# Patient Record
Sex: FEMALE | Race: WHITE | NOT HISPANIC OR LATINO | URBAN - METROPOLITAN AREA
[De-identification: names, ages, dates, MRNs, and addresses within clinical notes are randomized per-mention and may not be internally consistent; named-entity substitution may affect disease eponyms.]

---

## 2019-08-06 ENCOUNTER — NEW PATIENT (OUTPATIENT)
Dept: URBAN - METROPOLITAN AREA CLINIC 30 | Facility: CLINIC | Age: 69
End: 2019-08-06
Payer: MEDICARE

## 2019-08-06 DIAGNOSIS — H16.223 KERATOCONJUNCTIVITIS SICCA, BILATERAL: ICD-10-CM

## 2019-08-06 DIAGNOSIS — H25.813 COMBINED FORMS OF AGE-RELATED CATARACT, BILATERAL: ICD-10-CM

## 2019-08-06 DIAGNOSIS — R73.03 OTHER ABNORMAL GLUCOSE: Primary | ICD-10-CM

## 2019-08-06 PROCEDURE — 92134 CPTRZ OPH DX IMG PST SGM RTA: CPT | Performed by: OPTOMETRIST

## 2019-08-06 PROCEDURE — 92004 COMPRE OPH EXAM NEW PT 1/>: CPT | Performed by: OPTOMETRIST

## 2019-08-06 ASSESSMENT — INTRAOCULAR PRESSURE
OD: 14
OS: 14

## 2019-08-06 ASSESSMENT — KERATOMETRY
OD: 42.97
OS: 42.88

## 2019-08-06 ASSESSMENT — VISUAL ACUITY
OS: 20/30
OD: 20/30

## 2019-09-03 ENCOUNTER — Encounter (OUTPATIENT)
Dept: URBAN - METROPOLITAN AREA CLINIC 30 | Facility: CLINIC | Age: 69
End: 2019-09-03
Payer: MEDICARE

## 2019-09-03 DIAGNOSIS — H25.11 AGE-RELATED NUCLEAR CATARACT, RIGHT EYE: Primary | ICD-10-CM

## 2019-09-03 DIAGNOSIS — Z01.818 ENCOUNTER FOR OTHER PREPROCEDURAL EXAMINATION: Primary | ICD-10-CM

## 2019-09-03 PROCEDURE — 99213 OFFICE O/P EST LOW 20 MIN: CPT | Performed by: PHYSICIAN ASSISTANT

## 2019-09-03 RX ORDER — CETIRIZINE HCL/PSEUDOEPHEDRINE 5 MG-120MG
10 MG TABLET, EXTENDED RELEASE 12 HR ORAL
Qty: 0 | Refills: 0 | Status: ACTIVE
Start: 2019-09-03

## 2019-09-03 RX ORDER — LIOTHYRONINE SODIUM 5 UG/1
TABLET ORAL
Qty: 0 | Refills: 0 | Status: INACTIVE
Start: 2019-09-03 | End: 2020-03-03

## 2019-09-03 RX ORDER — ESOMEPRAZOLE MAGNESIUM 40 MG/1
40 MG CAPSULE, DELAYED RELEASE ORAL
Qty: 0 | Refills: 0 | Status: INACTIVE
Start: 2019-09-03 | End: 2020-03-03

## 2019-09-09 ENCOUNTER — NEW PATIENT (OUTPATIENT)
Dept: URBAN - METROPOLITAN AREA CLINIC 24 | Facility: CLINIC | Age: 69
End: 2019-09-09
Payer: MEDICARE

## 2019-09-09 PROCEDURE — 92012 INTRM OPH EXAM EST PATIENT: CPT | Performed by: OPHTHALMOLOGY

## 2019-09-09 PROCEDURE — 76519 ECHO EXAM OF EYE: CPT | Performed by: OPHTHALMOLOGY

## 2019-09-09 ASSESSMENT — INTRAOCULAR PRESSURE
OD: 14
OS: 14

## 2019-09-16 ENCOUNTER — SURGERY (OUTPATIENT)
Dept: URBAN - METROPOLITAN AREA SURGERY 12 | Facility: SURGERY | Age: 69
End: 2019-09-16
Payer: MEDICARE

## 2019-09-16 PROCEDURE — 66984 XCAPSL CTRC RMVL W/O ECP: CPT | Performed by: OPHTHALMOLOGY

## 2019-09-17 ENCOUNTER — POST OP (OUTPATIENT)
Dept: URBAN - METROPOLITAN AREA CLINIC 30 | Facility: CLINIC | Age: 69
End: 2019-09-17

## 2019-09-17 DIAGNOSIS — Z96.1 PRESENCE OF INTRAOCULAR LENS: Primary | ICD-10-CM

## 2019-09-17 PROCEDURE — 99024 POSTOP FOLLOW-UP VISIT: CPT | Performed by: OPTOMETRIST

## 2019-09-17 ASSESSMENT — INTRAOCULAR PRESSURE: OD: 17

## 2019-09-23 ENCOUNTER — POST OP (OUTPATIENT)
Dept: URBAN - METROPOLITAN AREA CLINIC 30 | Facility: CLINIC | Age: 69
End: 2019-09-23

## 2019-09-23 PROCEDURE — 99024 POSTOP FOLLOW-UP VISIT: CPT | Performed by: OPTOMETRIST

## 2019-09-23 ASSESSMENT — INTRAOCULAR PRESSURE
OD: 16
OS: 18

## 2019-09-23 ASSESSMENT — VISUAL ACUITY
OD: 20/25
OS: 20/30

## 2019-09-30 ENCOUNTER — SURGERY (OUTPATIENT)
Dept: URBAN - METROPOLITAN AREA SURGERY 12 | Facility: SURGERY | Age: 69
End: 2019-09-30
Payer: MEDICARE

## 2019-09-30 PROCEDURE — 66984 XCAPSL CTRC RMVL W/O ECP: CPT | Performed by: OPHTHALMOLOGY

## 2019-10-01 ENCOUNTER — POST OP (OUTPATIENT)
Dept: URBAN - METROPOLITAN AREA CLINIC 30 | Facility: CLINIC | Age: 69
End: 2019-10-01

## 2019-10-01 PROCEDURE — 99024 POSTOP FOLLOW-UP VISIT: CPT | Performed by: OPTOMETRIST

## 2019-10-01 ASSESSMENT — INTRAOCULAR PRESSURE: OS: 15

## 2019-11-01 ENCOUNTER — POST OP (OUTPATIENT)
Dept: URBAN - METROPOLITAN AREA CLINIC 30 | Facility: CLINIC | Age: 69
End: 2019-11-01

## 2019-11-01 PROCEDURE — 92015 DETERMINE REFRACTIVE STATE: CPT | Performed by: OPTOMETRIST

## 2019-11-01 PROCEDURE — 99024 POSTOP FOLLOW-UP VISIT: CPT | Performed by: OPTOMETRIST

## 2019-11-01 RX ORDER — PREDNISOLONE ACETATE 10 MG/ML
1 % SUSPENSION/ DROPS OPHTHALMIC
Qty: 5 | Refills: 1 | Status: INACTIVE
Start: 2019-11-01 | End: 2020-02-27

## 2019-11-01 ASSESSMENT — VISUAL ACUITY
OS: 20/25
OD: 20/25

## 2019-11-01 ASSESSMENT — INTRAOCULAR PRESSURE
OD: 14
OS: 14

## 2020-02-27 ENCOUNTER — FOLLOW UP ESTABLISHED (OUTPATIENT)
Dept: URBAN - METROPOLITAN AREA CLINIC 30 | Facility: CLINIC | Age: 70
End: 2020-02-27
Payer: MEDICARE

## 2020-02-27 DIAGNOSIS — H26.491 OTHER SECONDARY CATARACT, RIGHT EYE: ICD-10-CM

## 2020-02-27 PROCEDURE — 92134 CPTRZ OPH DX IMG PST SGM RTA: CPT | Performed by: OPTOMETRIST

## 2020-02-27 PROCEDURE — 92014 COMPRE OPH EXAM EST PT 1/>: CPT | Performed by: OPTOMETRIST

## 2020-02-27 ASSESSMENT — VISUAL ACUITY
OD: 20/30
OS: 20/25

## 2020-02-27 ASSESSMENT — KERATOMETRY
OS: 43.10
OD: 43.24

## 2020-02-27 ASSESSMENT — INTRAOCULAR PRESSURE
OS: 13
OD: 14

## 2020-03-03 ENCOUNTER — Encounter (OUTPATIENT)
Dept: URBAN - METROPOLITAN AREA CLINIC 30 | Facility: CLINIC | Age: 70
End: 2020-03-03
Payer: MEDICARE

## 2020-03-03 PROCEDURE — 99213 OFFICE O/P EST LOW 20 MIN: CPT | Performed by: PHYSICIAN ASSISTANT

## 2020-03-03 RX ORDER — LIOTHYRONINE SODIUM 5 UG/1
TABLET ORAL
Qty: 0 | Refills: 0 | Status: ACTIVE
Start: 2020-03-03

## 2020-03-17 ENCOUNTER — SURGERY (OUTPATIENT)
Dept: URBAN - METROPOLITAN AREA SURGERY 12 | Facility: SURGERY | Age: 70
End: 2020-03-17
Payer: MEDICARE

## 2020-03-17 PROCEDURE — 66821 AFTER CATARACT LASER SURGERY: CPT | Performed by: OPHTHALMOLOGY

## 2020-08-18 ENCOUNTER — FOLLOW UP ESTABLISHED (OUTPATIENT)
Dept: URBAN - METROPOLITAN AREA CLINIC 30 | Facility: CLINIC | Age: 70
End: 2020-08-18
Payer: MEDICARE

## 2020-08-18 DIAGNOSIS — H26.492 OTHER SECONDARY CATARACT, LEFT EYE: ICD-10-CM

## 2020-08-18 DIAGNOSIS — H43.813 VITREOUS DEGENERATION, BILATERAL: ICD-10-CM

## 2020-08-18 PROCEDURE — 92014 COMPRE OPH EXAM EST PT 1/>: CPT | Performed by: OPTOMETRIST

## 2020-08-18 PROCEDURE — 92134 CPTRZ OPH DX IMG PST SGM RTA: CPT | Performed by: OPTOMETRIST

## 2020-08-18 ASSESSMENT — KERATOMETRY
OD: 43.10
OS: 43.24

## 2020-08-18 ASSESSMENT — VISUAL ACUITY
OD: 20/30
OS: 20/25

## 2020-11-09 ENCOUNTER — APPOINTMENT (OUTPATIENT)
Dept: URBAN - METROPOLITAN AREA CLINIC 282 | Age: 70
Setting detail: DERMATOLOGY
End: 2020-11-10

## 2020-11-09 DIAGNOSIS — L80 VITILIGO: ICD-10-CM

## 2020-11-09 DIAGNOSIS — Z12.83 ENCOUNTER FOR SCREENING FOR MALIGNANT NEOPLASM OF SKIN: ICD-10-CM

## 2020-11-09 DIAGNOSIS — L73.8 OTHER SPECIFIED FOLLICULAR DISORDERS: ICD-10-CM

## 2020-11-09 DIAGNOSIS — Z71.89 OTHER SPECIFIED COUNSELING: ICD-10-CM

## 2020-11-09 DIAGNOSIS — D18.0 HEMANGIOMA: ICD-10-CM

## 2020-11-09 DIAGNOSIS — L82.0 INFLAMED SEBORRHEIC KERATOSIS: ICD-10-CM

## 2020-11-09 DIAGNOSIS — L82.1 OTHER SEBORRHEIC KERATOSIS: ICD-10-CM

## 2020-11-09 PROBLEM — D18.01 HEMANGIOMA OF SKIN AND SUBCUTANEOUS TISSUE: Status: ACTIVE | Noted: 2020-11-09

## 2020-11-09 PROCEDURE — OTHER LIQUID NITROGEN: OTHER

## 2020-11-09 PROCEDURE — OTHER REASSURANCE: OTHER

## 2020-11-09 PROCEDURE — OTHER PRESCRIPTION: OTHER

## 2020-11-09 PROCEDURE — OTHER COUNSELING: OTHER

## 2020-11-09 PROCEDURE — 99203 OFFICE O/P NEW LOW 30 MIN: CPT | Mod: 25

## 2020-11-09 PROCEDURE — 17110 DESTRUCT B9 LESION 1-14: CPT

## 2020-11-09 RX ORDER — TACROLIMUS 1 MG/G
OINTMENT TOPICAL
Qty: 1 | Refills: 1 | Status: ERX | COMMUNITY
Start: 2020-11-09

## 2020-11-09 ASSESSMENT — LOCATION DETAILED DESCRIPTION DERM
LOCATION DETAILED: RIGHT INFERIOR LATERAL UPPER BACK
LOCATION DETAILED: LEFT MEDIAL UPPER BACK
LOCATION DETAILED: EPIGASTRIC SKIN
LOCATION DETAILED: RIGHT SUPERIOR MEDIAL MIDBACK
LOCATION DETAILED: RIGHT DISTAL DORSAL FOREARM
LOCATION DETAILED: RIGHT SUPERIOR CENTRAL MALAR CHEEK
LOCATION DETAILED: SUPERIOR THORACIC SPINE
LOCATION DETAILED: LEFT DISTAL DORSAL FOREARM
LOCATION DETAILED: RIGHT INFERIOR UPPER BACK

## 2020-11-09 ASSESSMENT — LOCATION ZONE DERM
LOCATION ZONE: FACE
LOCATION ZONE: ARM
LOCATION ZONE: TRUNK

## 2020-11-09 ASSESSMENT — LOCATION SIMPLE DESCRIPTION DERM
LOCATION SIMPLE: RIGHT UPPER BACK
LOCATION SIMPLE: ABDOMEN
LOCATION SIMPLE: LEFT FOREARM
LOCATION SIMPLE: RIGHT LOWER BACK
LOCATION SIMPLE: RIGHT CHEEK
LOCATION SIMPLE: RIGHT FOREARM
LOCATION SIMPLE: UPPER BACK
LOCATION SIMPLE: LEFT UPPER BACK

## 2020-11-09 NOTE — PROCEDURE: LIQUID NITROGEN
Detail Level: Detailed
Include Z78.9 (Other Specified Conditions Influencing Health Status) As An Associated Diagnosis?: No
Consent: The patient's consent was obtained including but not limited to risks of crusting, scabbing, blistering, scarring, darker or lighter pigmentary change, recurrence, incomplete removal and infection.
Duration Of Freeze Thaw-Cycle (Seconds): 10-15
Medical Necessity Clause: This procedure was medically necessary because the lesions that were treated were:
Number Of Freeze-Thaw Cycles: 2 freeze-thaw cycles
Medical Necessity Information: It is in your best interest to select a reason for this procedure from the list below. All of these items fulfill various CMS LCD requirements except the new and changing color options.
Post-Care Instructions: I reviewed with the patient in detail post-care instructions. Patient is to wear sunprotection, and avoid picking at any of the treated lesions. Pt may apply Vaseline to crusted or scabbing areas.

## 2022-11-02 ENCOUNTER — HOSPITAL ENCOUNTER (EMERGENCY)
Facility: CLINIC | Age: 72
Discharge: HOME | End: 2022-11-02
Attending: EMERGENCY MEDICINE | Admitting: EMERGENCY MEDICINE
Payer: MEDICARE

## 2022-11-02 ENCOUNTER — APPOINTMENT (OUTPATIENT)
Dept: RADIOLOGY | Facility: CLINIC | Age: 72
End: 2022-11-02
Attending: EMERGENCY MEDICINE
Payer: MEDICARE

## 2022-11-02 ENCOUNTER — APPOINTMENT (OUTPATIENT)
Dept: CT IMAGING | Facility: CLINIC | Age: 72
End: 2022-11-02
Attending: EMERGENCY MEDICINE
Payer: MEDICARE

## 2022-11-02 VITALS
DIASTOLIC BLOOD PRESSURE: 79 MMHG | HEART RATE: 83 BPM | OXYGEN SATURATION: 98 % | HEIGHT: 67 IN | TEMPERATURE: 98.3 F | BODY MASS INDEX: 32.96 KG/M2 | RESPIRATION RATE: 20 BRPM | WEIGHT: 210 LBS | SYSTOLIC BLOOD PRESSURE: 180 MMHG
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
## 2022-11-02 DIAGNOSIS — S01.01XA SCALP LACERATION, INITIAL ENCOUNTER: ICD-10-CM

## 2022-11-02 DIAGNOSIS — S42.291A OTHER CLOSED DISPLACED FRACTURE OF PROXIMAL END OF RIGHT HUMERUS, INITIAL ENCOUNTER: ICD-10-CM

## 2022-11-02 PROBLEM — M47.816 LUMBAR SPONDYLOSIS: Status: ACTIVE | Noted: 2022-04-04

## 2022-11-02 PROBLEM — M54.59 OTHER LOW BACK PAIN: Status: ACTIVE | Noted: 2022-04-04

## 2022-11-02 PROBLEM — M17.11 PRIMARY OSTEOARTHRITIS OF RIGHT KNEE: Status: ACTIVE | Noted: 2022-07-20

## 2022-11-02 PROBLEM — Z86.69 HISTORY OF SEIZURE DISORDER: Status: ACTIVE | Noted: 2022-06-14

## 2022-11-02 PROBLEM — I63.9 CEREBRAL INFARCTION (H): Status: ACTIVE | Noted: 2022-09-22

## 2022-11-02 PROBLEM — F41.9 ANXIETY: Status: ACTIVE | Noted: 2022-05-12

## 2022-11-02 PROBLEM — N17.9 ACUTE RENAL FAILURE SUPERIMPOSED ON STAGE 3A CHRONIC KIDNEY DISEASE (H): Status: ACTIVE | Noted: 2022-09-22

## 2022-11-02 PROBLEM — I50.32 CHRONIC DIASTOLIC HEART FAILURE (H): Status: ACTIVE | Noted: 2022-09-22

## 2022-11-02 PROBLEM — E03.9 ACQUIRED HYPOTHYROIDISM: Status: ACTIVE | Noted: 2022-05-12

## 2022-11-02 PROBLEM — I10 PRIMARY HYPERTENSION: Status: ACTIVE | Noted: 2022-05-12

## 2022-11-02 PROBLEM — F33.1 MODERATE EPISODE OF RECURRENT MAJOR DEPRESSIVE DISORDER (H): Status: ACTIVE | Noted: 2022-05-12

## 2022-11-02 PROBLEM — N18.31 ACUTE RENAL FAILURE SUPERIMPOSED ON STAGE 3A CHRONIC KIDNEY DISEASE (H): Status: ACTIVE | Noted: 2022-09-22

## 2022-11-02 PROBLEM — R42 DIZZINESS: Status: ACTIVE | Noted: 2022-05-12

## 2022-11-02 PROBLEM — M51.26 LUMBAR DISC HERNIATION: Status: ACTIVE | Noted: 2022-04-04

## 2022-11-02 PROBLEM — R09.02 HYPOXIA: Status: ACTIVE | Noted: 2022-08-26

## 2022-11-02 PROBLEM — G47.33 OSA (OBSTRUCTIVE SLEEP APNEA): Status: ACTIVE | Noted: 2022-05-12

## 2022-11-02 PROBLEM — L80 VITILIGO: Status: ACTIVE | Noted: 2022-05-12

## 2022-11-02 PROBLEM — J45.30 MILD PERSISTENT ASTHMA WITHOUT COMPLICATION: Status: ACTIVE | Noted: 2022-05-12

## 2022-11-02 PROBLEM — M79.10 MYALGIA: Status: ACTIVE | Noted: 2022-04-04

## 2022-11-02 PROBLEM — K44.9 HIATAL HERNIA: Status: ACTIVE | Noted: 2021-02-22

## 2022-11-02 PROBLEM — M51.369 LUMBAR DEGENERATIVE DISC DISEASE: Status: ACTIVE | Noted: 2022-04-04

## 2022-11-02 PROBLEM — D64.9 POSTOPERATIVE ANEMIA: Status: ACTIVE | Noted: 2022-09-22

## 2022-11-02 PROBLEM — G89.29 OTHER CHRONIC PAIN: Status: ACTIVE | Noted: 2022-04-04

## 2022-11-02 PROBLEM — N39.41 URGE INCONTINENCE: Status: ACTIVE | Noted: 2022-05-12

## 2022-11-02 LAB
ANION GAP SERPL CALCULATED.3IONS-SCNC: 11 MMOL/L (ref 5–18)
BASOPHILS # BLD AUTO: 0.1 10E3/UL (ref 0–0.2)
BASOPHILS NFR BLD AUTO: 0 %
BUN SERPL-MCNC: 19 MG/DL (ref 8–28)
CALCIUM SERPL-MCNC: 9.2 MG/DL (ref 8.5–10.5)
CHLORIDE BLD-SCNC: 105 MMOL/L (ref 98–107)
CO2 SERPL-SCNC: 26 MMOL/L (ref 22–31)
CREAT SERPL-MCNC: 1.07 MG/DL (ref 0.6–1.1)
EOSINOPHIL # BLD AUTO: 0.3 10E3/UL (ref 0–0.7)
EOSINOPHIL NFR BLD AUTO: 2 %
ERYTHROCYTE [DISTWIDTH] IN BLOOD BY AUTOMATED COUNT: 18.8 % (ref 10–15)
GFR SERPL CREATININE-BSD FRML MDRD: 56 ML/MIN/1.73M2
GLUCOSE BLD-MCNC: 110 MG/DL (ref 70–125)
HCT VFR BLD AUTO: 35.5 % (ref 35–47)
HGB BLD-MCNC: 10.6 G/DL (ref 11.7–15.7)
IMM GRANULOCYTES # BLD: 0.1 10E3/UL
IMM GRANULOCYTES NFR BLD: 1 %
LYMPHOCYTES # BLD AUTO: 2 10E3/UL (ref 0.8–5.3)
LYMPHOCYTES NFR BLD AUTO: 14 %
MCH RBC QN AUTO: 24.2 PG (ref 26.5–33)
MCHC RBC AUTO-ENTMCNC: 29.9 G/DL (ref 31.5–36.5)
MCV RBC AUTO: 81 FL (ref 78–100)
MONOCYTES # BLD AUTO: 0.8 10E3/UL (ref 0–1.3)
MONOCYTES NFR BLD AUTO: 6 %
NEUTROPHILS # BLD AUTO: 10.8 10E3/UL (ref 1.6–8.3)
NEUTROPHILS NFR BLD AUTO: 77 %
NRBC # BLD AUTO: 0 10E3/UL
NRBC BLD AUTO-RTO: 0 /100
PLATELET # BLD AUTO: 302 10E3/UL (ref 150–450)
POTASSIUM BLD-SCNC: 3.7 MMOL/L (ref 3.5–5)
RBC # BLD AUTO: 4.38 10E6/UL (ref 3.8–5.2)
SODIUM SERPL-SCNC: 142 MMOL/L (ref 136–145)
WBC # BLD AUTO: 14 10E3/UL (ref 4–11)

## 2022-11-02 PROCEDURE — 36415 COLL VENOUS BLD VENIPUNCTURE: CPT | Performed by: EMERGENCY MEDICINE

## 2022-11-02 PROCEDURE — 96374 THER/PROPH/DIAG INJ IV PUSH: CPT | Mod: 59

## 2022-11-02 PROCEDURE — 73560 X-RAY EXAM OF KNEE 1 OR 2: CPT | Mod: RT

## 2022-11-02 PROCEDURE — 70450 CT HEAD/BRAIN W/O DYE: CPT

## 2022-11-02 PROCEDURE — 73070 X-RAY EXAM OF ELBOW: CPT | Mod: RT

## 2022-11-02 PROCEDURE — 99285 EMERGENCY DEPT VISIT HI MDM: CPT | Mod: 25

## 2022-11-02 PROCEDURE — 250N000013 HC RX MED GY IP 250 OP 250 PS 637: Performed by: EMERGENCY MEDICINE

## 2022-11-02 PROCEDURE — 85025 COMPLETE CBC W/AUTO DIFF WBC: CPT | Performed by: EMERGENCY MEDICINE

## 2022-11-02 PROCEDURE — 80048 BASIC METABOLIC PNL TOTAL CA: CPT | Performed by: EMERGENCY MEDICINE

## 2022-11-02 PROCEDURE — 72125 CT NECK SPINE W/O DYE: CPT

## 2022-11-02 PROCEDURE — 250N000011 HC RX IP 250 OP 636: Performed by: EMERGENCY MEDICINE

## 2022-11-02 PROCEDURE — 23620 CLTX GR HMRL TBRS FX WO MNPJ: CPT | Mod: RT

## 2022-11-02 PROCEDURE — 73030 X-RAY EXAM OF SHOULDER: CPT | Mod: RT

## 2022-11-02 PROCEDURE — 12001 RPR S/N/AX/GEN/TRNK 2.5CM/<: CPT

## 2022-11-02 RX ORDER — OXYCODONE HYDROCHLORIDE 5 MG/1
5 TABLET ORAL ONCE
Status: COMPLETED | OUTPATIENT
Start: 2022-11-02 | End: 2022-11-02

## 2022-11-02 RX ORDER — MORPHINE SULFATE 10 MG/ML
6 INJECTION, SOLUTION INTRAMUSCULAR; INTRAVENOUS
Status: COMPLETED | OUTPATIENT
Start: 2022-11-02 | End: 2022-11-02

## 2022-11-02 RX ORDER — OXYCODONE HYDROCHLORIDE 5 MG/1
5 TABLET ORAL EVERY 6 HOURS PRN
Qty: 12 TABLET | Refills: 0 | Status: SHIPPED | OUTPATIENT
Start: 2022-11-02 | End: 2022-11-05

## 2022-11-02 RX ADMIN — OXYCODONE HYDROCHLORIDE 5 MG: 5 TABLET ORAL at 20:34

## 2022-11-02 RX ADMIN — MORPHINE SULFATE 6 MG: 10 INJECTION INTRAVENOUS at 18:46

## 2022-11-02 ASSESSMENT — ACTIVITIES OF DAILY LIVING (ADL)
ADLS_ACUITY_SCORE: 35
ADLS_ACUITY_SCORE: 35

## 2022-11-02 NOTE — ED TRIAGE NOTES
Patient is here with a fall and head laceration. She was at [facility] and had to have a BM then slipped and fell. She does have stool on her hands and all over below the waist. She does have blood on her forehead from the laceration to her head. No LOC.

## 2022-11-02 NOTE — ED PROVIDER NOTES
EMERGENCY DEPARTMENT ENCOUNTER      NAME: Marti Cruz  AGE: 72 year old female  YOB: 1950  MRN: [id number]  EVALUATION DATE & TIME: 11/2/2022  5:30 PM    PCP: No primary care provider on file.    ED PROVIDER: Lobo Poole M.D.      Chief Complaint   Patient presents with     Fall     Shoulder Pain         FINAL IMPRESSION:  1. Fall, initial encounter    2. Scalp laceration, initial encounter    3. Other closed displaced fracture of proximal end of right humerus, initial encounter          ED COURSE & MEDICAL DECISION MAKING:    Pertinent Labs & Imaging studies reviewed. (See chart for details)  72 year old female presents to the Emergency Department for evaluation of fall, right shoulder and elbow pain, right knee pain. Patient appears non toxic with stable vitals signs, patient afebrile with no tachycardia or hypoxia, no increased work of breathing.  Patient denies syncope, room spinning dizziness, no chest or pulmonary complaints, she has no chest or abdominal pain.  Denies headache or neck pain though she did strike her head, no loss of consciousness or vomiting, denies taking medications for anticoagulation.  Considered but low suspicion for depressed occult fracture or any cranial bleed, low suspicion for cervical fracture dislocation but we will obtain CT imaging of the head and C-spine.  Has focal tenderness to the right shoulder, elbow and knee, considered but low suspicion for fracture dislocation we will obtain plain films of the right shoulder, elbow and knee.  Rest of the extremity exam is benign.  Nothing to suggest other extremity trauma, intrathoracic or intra-abdominal trauma.  Patient believed her tetanus was up to date. Scalp laceration was thoroughly irrigated and inspected in a bloodless field no gross contamination foreign body retention.  Scalp laceration was appropriate repaired and dressed and wound care education provided.  This sounds to be a mechanical fall with no  presyncopal episodes or focal deficits on exam with certainly nothing to suggest dysrhythmia, anemia, electrolyte abnormality, CVA, or other more malicious etiology of symptoms.  Patient was given pain medications here.    Reassessment: Labs showed no acute concerning findings.  CT imaging reported no acute concerning findings and plain films of the right knee showed no acute concerning findings.  Plain films of the shoulder reported comminuted fracture of the proximal humerus, plain films of the elbow reported difficult to assess for fracture or effusion recommended if symptoms persist for follow-up plain films in 7 to 10 days.  Given the already known fracture of the proximal humerus I did discuss patient case with [facility] who at this time, given findings for the elbow, recommended shoulder immobilizer, symptomatic management and close follow-up with [facility].  Repeat exam the patient was benign.  Patient was placed in shoulder immobilizer, will discharge a short course of oxycodone and recommend that she follow-up with [facility] in the next 5 to 7 days.  Again scalp laceration was repaired and patient was instructed follow-up with primary care in 7 days for repeat wound evaluation and staple removal.  Discussed these findings and need for close follow-up with the patient who felt reassured and comfortable discharge.  Return precautions provided.    Medical Decision Making    Supplemental history from: N/A    External Record(s) Reviewed: Inpatient Record and Outpatient Record    Differential Diagnosis: See MDM charting for differential considered.     I performed an independent interpretation of the: N/A    Discussed with radiology regarding test interpretation: N/A    Discussion of management with another provider: See ED Course and Orthopedics    The following testing was considered but ultimately not selected: None     I considered prescription management with: Symptomatic Management    The  patient's care impacted: None     Consideration of Admission/Observation: N/A - Patient discharged without consideration for admission    Care significantly affected by Social Determinants of Health including: N/A      5:44 PM I met with the patient, obtained history, performed an initial exam, and discussed options and plan for diagnostics and treatment here in the ED.   7:13 PM I spoke with [facility] about putting the patient in a shoulder immobilizer.  7:36 PM I performed the laceration repair, discussed findings, shoulder immobilizer, and orthopedics follow-up.  8:03 PM I finished the laceration repair.    At the conclusion of the encounter I discussed the results of all of the tests and the disposition. The questions were answered and return precautions provided. The patient or family acknowledged understanding and was agreeable with the care plan.         MEDICATIONS GIVEN IN THE EMERGENCY:  Medications   lidocaine/EPINEPHrine/tetracaine (LET) solution KIT 3 mL ( Topical Not Given 11/2/22 1943)   Morphine Sulfate (PF) injection 6 mg (6 mg Intravenous Given 11/2/22 1846)   oxyCODONE (ROXICODONE) tablet 5 mg (5 mg Oral Given 11/2/22 2034)       NEW PRESCRIPTIONS STARTED AT TODAY'S ER VISIT  Discharge Medication List as of 11/2/2022  8:36 PM      START taking these medications    Details   oxyCODONE (ROXICODONE) 5 MG tablet Take 1 tablet (5 mg) by mouth every 6 hours as needed for pain, Disp-12 tablet, R-0, Local Print                  =================================================================    HPI    Patient information was obtained from: patient     Use of Intrepreter: N/A       Marti Cruz is a 72 year old female who presents via EMS with injuries from a fall.    Earlier this afternoon the patient was in [place] and had diarrhea but did not make it to the bathroom. She slipped, fell, hit her head, and then landed on her right shoulder and right knee. Her right shoulder hurts the most and she  "would not like to move it. In the ambulance she was given 50 mcg fentanyl for the pain. She also has pain that does not radiate from a laceration to her scalp with dried blood coming down to her forehead. Her right wrist does not hurt and she is able to move her right knee and elbow with mild discomfort. She takes antidepressants and blood pressure medications daily. She does not take blood thinners. She denies neck pain, loss of consciousness, abdominal pain, chest pain, or any other complaints at this time.       REVIEW OF SYSTEMS   Constitutional:  Denies fever, chills  Respiratory:  Denies productive cough or increased work of breathing  Cardiovascular:  Denies chest pain, palpitations  GI:  Denies abdominal pain, nausea, vomiting, or change in bowel or bladder habits   Musculoskeletal:  Positive for pain in right shoulder pain, head pain, right elbow, and right knee pain.  Denies neck pain.  Skin:  Denies rash   Neurologic:  Denies focal weakness. Denies loss of consciousness.  All systems negative except as marked.       PAST MEDICAL HISTORY:  No past medical history on file.    PAST SURGICAL HISTORY:  No past surgical history on file.      CURRENT MEDICATIONS:    Prior to Admission medications    Not on File        ALLERGIES:  No Known Allergies    FAMILY HISTORY:  No family history on file.    SOCIAL HISTORY:        VITALS:  Patient Vitals for the past 24 hrs:   BP Temp Temp src Pulse Resp SpO2 Height Weight   11/02/22 1741 (!) 180/79 -- Oral 83 20 98 % -- --   11/02/22 1740 (!) 180/79 98.3  F (36.8  C) Oral 83 20 98 % -- --   11/02/22 1731 -- -- -- -- -- -- 1.702 m (5' 7\") 95.3 kg (210 lb)        PHYSICAL EXAM    Constitutional:  Awake, alert, in no apparent distress  HENT:  No scalp hematomas or skull depressions, small laceration along midline of frontal scalp, no signs of basilar skull injury, Bilateral external ears normal, Oropharynx moist with no signs of acute dental trauma, Nose normal with no " septal hematoma. Neck- Normal range of motion with no guarding, No midline cervical tenderness, Supple, No stridor.   Eyes:  PERRL, EOMI with no signs of entrapment, Conjunctiva normal, No discharge.   Respiratory:  Normal breath sounds, No respiratory distress, No wheezing.  No signs of flail chest  Cardiovascular:  Normal heart rate, Normal rhythm, No appreciable rubs or gallops.   GI:  Soft, No tenderness, No distension, No palpable masses  Musculoskeletal:  Intact distal pulses with good radial and ulnar pulses, No edema.  Full sensation in the radial, ulnar and median nerve distributions.  Guarded range of motion at right elbow and shoulder with focal tenderness, no gross deformities.  Tenderness to the right knee but good range of motion.  Back-nontender along midline cervical, thoracic and lumbar spine with no step-offs or signs of trauma.  Pelvis is stable.  Integument:  Warm, Dry, No erythema, No rash.   Neurologic:  Alert & oriented, Normal motor function, Normal sensory function, No focal deficits noted.   Psychiatric:  Affect normal, Judgment normal, Mood normal.       LAB:  All pertinent labs reviewed and interpreted.  Results for orders placed or performed during the hospital encounter of 11/02/22   CT Head w/o Contrast    Impression    IMPRESSION:  HEAD CT:  1.  Suspect midline frontal scalp laceration without CT evidence for acute intracranial process.  2.  Brain atrophy and presumed chronic microvascular ischemic changes as above.    CERVICAL SPINE CT:  1.  No CT evidence for acute fracture or post traumatic subluxation.  2.  Multilevel cervical spondylosis including spinal canal stenosis at C5-C6 with multilevel severe neural foraminal stenoses.   CT Cervical Spine w/o Contrast    Impression    IMPRESSION:  HEAD CT:  1.  Suspect midline frontal scalp laceration without CT evidence for acute intracranial process.  2.  Brain atrophy and presumed chronic microvascular ischemic changes as  above.    CERVICAL SPINE CT:  1.  No CT evidence for acute fracture or post traumatic subluxation.  2.  Multilevel cervical spondylosis including spinal canal stenosis at C5-C6 with multilevel severe neural foraminal stenoses.   XR Elbow Right 2 Views    Impression    IMPRESSION: There is no true lateral view, so it is difficult to assess for fracture/effusion. There is a tiny ossification along the posteromedial aspect of the olecranon. This is well marginated and more likely related to old trauma, but since   evaluation is somewhat limited, an acute fracture would be difficult to exclude. If symptoms persist, follow-up radiographs in 7-10 days may be warranted.   XR Shoulder Right 2 Views    Impression    IMPRESSION: Comminuted fracture of the proximal humerus. This involves the surgical neck and the distal aspect of the greater tuberosity. There is up to 2 cm of displacement. Mild apex anterior angulation. Normal alignment at the glenohumeral joint.   Degenerative changes in the spine incidentally noted.   XR Knee Right 1/2 Views    Impression    IMPRESSION: Total knee arthroplasty. Excellent position and alignment of components. There is no evidence of complication or periprosthetic fracture.   Basic metabolic panel   Result Value Ref Range    Sodium 142 136 - 145 mmol/L    Potassium 3.7 3.5 - 5.0 mmol/L    Chloride 105 98 - 107 mmol/L    Carbon Dioxide (CO2) 26 22 - 31 mmol/L    Anion Gap 11 5 - 18 mmol/L    Urea Nitrogen 19 8 - 28 mg/dL    Creatinine 1.07 0.60 - 1.10 mg/dL    Calcium 9.2 8.5 - 10.5 mg/dL    Glucose 110 70 - 125 mg/dL    GFR Estimate 56 (L) >60 mL/min/1.73m2   CBC with platelets and differential   Result Value Ref Range    WBC Count 14.0 (H) 4.0 - 11.0 10e3/uL    RBC Count 4.38 3.80 - 5.20 10e6/uL    Hemoglobin 10.6 (L) 11.7 - 15.7 g/dL    Hematocrit 35.5 35.0 - 47.0 %    MCV 81 78 - 100 fL    MCH 24.2 (L) 26.5 - 33.0 pg    MCHC 29.9 (L) 31.5 - 36.5 g/dL    RDW 18.8 (H) 10.0 - 15.0 %     Platelet Count 302 150 - 450 10e3/uL    % Neutrophils 77 %    % Lymphocytes 14 %    % Monocytes 6 %    % Eosinophils 2 %    % Basophils 0 %    % Immature Granulocytes 1 %    NRBCs per 100 WBC 0 <1 /100    Absolute Neutrophils 10.8 (H) 1.6 - 8.3 10e3/uL    Absolute Lymphocytes 2.0 0.8 - 5.3 10e3/uL    Absolute Monocytes 0.8 0.0 - 1.3 10e3/uL    Absolute Eosinophils 0.3 0.0 - 0.7 10e3/uL    Absolute Basophils 0.1 0.0 - 0.2 10e3/uL    Absolute Immature Granulocytes 0.1 <=0.4 10e3/uL    Absolute NRBCs 0.0 10e3/uL       RADIOLOGY:  XR Elbow Right 2 Views   Final Result   IMPRESSION: There is no true lateral view, so it is difficult to assess for fracture/effusion. There is a tiny ossification along the posteromedial aspect of the olecranon. This is well marginated and more likely related to old trauma, but since    evaluation is somewhat limited, an acute fracture would be difficult to exclude. If symptoms persist, follow-up radiographs in 7-10 days may be warranted.      XR Knee Right 1/2 Views   Final Result   IMPRESSION: Total knee arthroplasty. Excellent position and alignment of components. There is no evidence of complication or periprosthetic fracture.      XR Shoulder Right 2 Views   Final Result   IMPRESSION: Comminuted fracture of the proximal humerus. This involves the surgical neck and the distal aspect of the greater tuberosity. There is up to 2 cm of displacement. Mild apex anterior angulation. Normal alignment at the glenohumeral joint.    Degenerative changes in the spine incidentally noted.      CT Cervical Spine w/o Contrast   Final Result   IMPRESSION:   HEAD CT:   1.  Suspect midline frontal scalp laceration without CT evidence for acute intracranial process.   2.  Brain atrophy and presumed chronic microvascular ischemic changes as above.      CERVICAL SPINE CT:   1.  No CT evidence for acute fracture or post traumatic subluxation.   2.  Multilevel cervical spondylosis including spinal canal stenosis  at C5-C6 with multilevel severe neural foraminal stenoses.      CT Head w/o Contrast   Final Result   IMPRESSION:   HEAD CT:   1.  Suspect midline frontal scalp laceration without CT evidence for acute intracranial process.   2.  Brain atrophy and presumed chronic microvascular ischemic changes as above.      CERVICAL SPINE CT:   1.  No CT evidence for acute fracture or post traumatic subluxation.   2.  Multilevel cervical spondylosis including spinal canal stenosis at C5-C6 with multilevel severe neural foraminal stenoses.         PROCEDURES  PROCEDURE: Laceration Repair   INDICATIONS: Laceration   PROCEDURE PROVIDER: Dr Lobo Poole   SITE: Frontal scalp   TYPE/SIZE: simple, clean and no foreign body visualized  2 cm (total length)   FUNCTIONAL ASSESSMENT: Distal sensation, circulation and motor intact   MEDICATION: 3 mLs of 1% Lidocaine without epinephrine   PREPARATION: irrigation with Normal saline   DEBRIDEMENT: no debridement   CLOSURE:  Superficial layer closed with 4 staples    Total number of sutures/staples placed: 4             I, Efrain Singletary, am serving as a scribe to document services personally performed by Lobo Poole MD, based on my observation and the provider's statements to me. I, Lobo Poole MD attest that Efrain Singletary is acting in a scribe capacity, has observed my performance of the services and has documented them in accordance with my direction.    Lobo Poole M.D.  Emergency Medicine  [facility] EMERGENCY ROOM  [id number] [id number] [phone]  Dept: [phone]     Lobo Poole MD  11/02/22 5027

## 2022-12-08 ENCOUNTER — TRANSFERRED RECORDS (OUTPATIENT)
Dept: HEALTH INFORMATION MANAGEMENT | Facility: CLINIC | Age: 72
End: 2022-12-08

## 2023-01-20 ENCOUNTER — TRANSFERRED RECORDS (OUTPATIENT)
Dept: HEALTH INFORMATION MANAGEMENT | Facility: CLINIC | Age: 73
End: 2023-01-20